# Patient Record
Sex: MALE | Race: WHITE | NOT HISPANIC OR LATINO | Employment: FULL TIME | URBAN - METROPOLITAN AREA
[De-identification: names, ages, dates, MRNs, and addresses within clinical notes are randomized per-mention and may not be internally consistent; named-entity substitution may affect disease eponyms.]

---

## 2022-09-12 ENCOUNTER — HOSPITAL ENCOUNTER (EMERGENCY)
Facility: CLINIC | Age: 27
Discharge: HOME | End: 2022-09-12
Attending: PHYSICIAN ASSISTANT | Admitting: PHYSICIAN ASSISTANT
Payer: COMMERCIAL

## 2022-09-12 VITALS
SYSTOLIC BLOOD PRESSURE: 112 MMHG | OXYGEN SATURATION: 99 % | BODY MASS INDEX: 28.12 KG/M2 | TEMPERATURE: 99.1 F | RESPIRATION RATE: 19 BRPM | WEIGHT: 175 LBS | DIASTOLIC BLOOD PRESSURE: 76 MMHG | HEIGHT: 66 IN | HEART RATE: 78 BPM

## 2022-09-12 DIAGNOSIS — F42.9 OBSESSIVE-COMPULSIVE DISORDER, UNSPECIFIED TYPE: ICD-10-CM

## 2022-09-12 LAB — SARS-COV-2 RNA RESP QL NAA+PROBE: NEGATIVE

## 2022-09-12 PROCEDURE — 90791 PSYCH DIAGNOSTIC EVALUATION: CPT

## 2022-09-12 PROCEDURE — C9803 HOPD COVID-19 SPEC COLLECT: HCPCS

## 2022-09-12 PROCEDURE — U0003 INFECTIOUS AGENT DETECTION BY NUCLEIC ACID (DNA OR RNA); SEVERE ACUTE RESPIRATORY SYNDROME CORONAVIRUS 2 (SARS-COV-2) (CORONAVIRUS DISEASE [COVID-19]), AMPLIFIED PROBE TECHNIQUE, MAKING USE OF HIGH THROUGHPUT TECHNOLOGIES AS DESCRIBED BY CMS-2020-01-R: HCPCS | Performed by: EMERGENCY MEDICINE

## 2022-09-12 PROCEDURE — 99204 OFFICE O/P NEW MOD 45 MIN: CPT | Performed by: PSYCHIATRY & NEUROLOGY

## 2022-09-12 PROCEDURE — U0005 INFEC AGEN DETEC AMPLI PROBE: HCPCS | Performed by: PHYSICIAN ASSISTANT

## 2022-09-12 PROCEDURE — 99285 EMERGENCY DEPT VISIT HI MDM: CPT | Mod: 25

## 2022-09-12 RX ORDER — HYDROXYZINE PAMOATE 25 MG/1
25 CAPSULE ORAL 3 TIMES DAILY PRN
Qty: 30 CAPSULE | Refills: 0 | Status: SHIPPED | OUTPATIENT
Start: 2022-09-12

## 2022-09-12 RX ORDER — FLUVOXAMINE MALEATE 50 MG
50 TABLET ORAL AT BEDTIME
Qty: 30 TABLET | Refills: 0 | Status: SHIPPED | OUTPATIENT
Start: 2022-09-12

## 2022-09-12 ASSESSMENT — ACTIVITIES OF DAILY LIVING (ADL)
ADLS_ACUITY_SCORE: 35
ADLS_ACUITY_SCORE: 35

## 2022-09-12 ASSESSMENT — ENCOUNTER SYMPTOMS
FEVER: 0
NERVOUS/ANXIOUS: 1
HALLUCINATIONS: 0

## 2022-09-12 NOTE — ED NOTES
Pt is discharge home to self in stable conditions, via private vehicle. Pt denies any S/H ideations, denies any A/V hallucinations. Med education and discharge instruction explained, pt verbalized understanding. All belongings returned to pt. Pt given supply of take home meds. Pt escorted out of unit in stable conditions.

## 2022-09-12 NOTE — ED NOTES
26 y/o white male came vol to ED for mental health stabilization. Pt is AAOx4, gait steady, skin intact. Pt verbalized feeling anxious and depress with worsening symptoms since 09/10/22 due to overwhelming stress about work and family. Pt stated he has a HX of anxiety and depression but is not currently taking any medications, nore seen a mental health expert. Pt denies any S/I and H/I, denies A/V hallucinations but stated within his life time he has had vivid vision of suicide but not currently and has never attempted suicide. Pt denies any medical conditions, denies any drug abuse but does use ETOH with last use being last Thursday, drinks on average 10 drinks per week. Pt denies any pain. Pt observed sad and depress, anxious, wringing his hands at times, seems short of breath when speaking but denies any respiratory distress. Assessment completed, pt oriented to unit, continue to monitor.

## 2022-09-12 NOTE — CONSULTS
Diagnostic Evaluation Consultation  Crisis Assessment    Patient was assessed: In Person  Patient location: [facility]  Was a release of information signed: Yes. Providers included on the release: all      Referral Data and Chief Complaint  Reji is a 27 year old, who uses he/him pronouns, and presents to the ED alone. Patient is referred to the ED by family/friends. Patient is presenting to the ED for the following concerns: worsening anxiety & OCD sx.      Informed Consent and Assessment Methods     Patient is his own guardian. Writer met with patient and explained the crisis assessment process, including applicable information disclosures and limits to confidentiality, assessed understanding of the process, and obtained consent to proceed with the assessment. Patient was observed to be able to participate in the assessment as evidenced by verbal understanding. Assessment methods included conducting a formal interview with patient, review of medical records, collaboration with medical staff, and obtaining relevant collateral information from family and community providers when available..     Over the course of this crisis assessment provided reassurance, offered validation, engaged patient in problem solving and disposition planning, worked with patient on safety and aftercare planning, worked with patient on brief, therapeutic activity: mindfulness, assisted in processing patient's thoughts and feeling relating to guilt & shame around intrusive thoughts and provided psychoeducation. Patient's response to interventions was receptive & engaged.     Summary of Patient Situation  Reji is a 28yo who presented to the ED due to worsening anxiety & intrusive thinking over the past few days. Pt reports he has been dealing with anxiety & OCD sx on & off since middle school but that he has been able to manage it on his own without medications or therapy. He reports that he has had a significant increase over the past few days.  "He reports he called into work yesterday due to the thoughts being too exhausting. He reports that he has mostly used distraction to cope as well as alcohol. He reports drinking about 2-4 drinks at a time & about 10-12 total a week.      Brief Psychosocial History  Reji currently lives with his father. He identifies his father, mother & a couple of friends as supports. He works full-time for [email or web address] as a [employer]. He identifies stressors of being short-staffed at work, at times living with his father, and his aunt  a couple of weeks ago.     Significant Clinical History  Reji denies any past treatment for his mental health despite dealing with anxiety since middle school. He reports he feels he's managed it pretty well independently up until recently.     Pt reports sx of racing thoughts, distressing intrusive thoughts, questioning if he is \"hypersexual\" due to his thoughts, disrupted appetite, shame & guilt around his intrusive thoughts. He reports he watches porn & wonders if he is a sexual deviant. Pt denies any desire/intent to act on his thoughts but is fearful of them. He did not disclose nature of thoughts but alluded to them being sexual in nature. He denies ever acting on them & expresses a desire to feel better.      Collateral Information    Wtr attempted to call pt's emergency contact, Venu Dixon [phone], no answer.        Risk Assessment  ESS-6  1.a. Over the past 2 weeks, have you had thoughts of killing yourself? No  1.b. Have you ever attempted to kill yourself and, if yes, when did this last happen? No   2. Recent or current suicide plan? No   3. Recent or current intent to act on ideation? No  4. Lifetime psychiatric hospitalization? No  5. Pattern of excessive substance use? No  6. Current irritability, agitation, or aggression? No  Scoring note: BOTH 1a and 1b must be yes for it to score 1 point, if both are not yes it is zero. All others are 1 point per number. If all questions " 1a/1b - 6 are no, risk is negligible. If one of 1a/1b is yes, then risk is mild. If either question 2 or 3, but not both, is yes, then risk is automatically moderate regardless of total score. If both 2 and 3 are yes, risk is automatically high regardless of total score.      Score: 0, mild risk      Does the patient have access to lethal means? No     Does the patient engage in non-suicidal self-injurious behavior (NSSI/SIB)? no     Does the patient have thoughts of harming others? No     Is the patient engaging in sexually inappropriate behavior?  no        Current Substance Abuse     Is there recent substance abuse? Substance type(s): alcohol Frequency: 3-4 times a week Quantity: 2-4 drinks Method: ingests Duration: a few months Last use: wknd     Was a urine drug screen or blood alcohol level obtained: No       Mental Status Exam     Affect: Constricted   Appearance: Appropriate    Attention Span/Concentration: Attentive  Eye Contact: Avoidant   Fund of Knowledge: Appropriate    Language /Speech Content: Fluent   Language /Speech Volume: Soft    Language /Speech Rate/Productions: Normal    Recent Memory: Intact   Remote Memory: Intact   Mood: Anxious, Depressed and Sad    Orientation to Person: Yes    Orientation to Place: Yes   Orientation to Time of Day: Yes    Orientation to Date: Yes    Situation (Do they understand why they are here?): Yes    Psychomotor Behavior: Normal    Thought Content: Clear   Thought Form: Obsessive/Perseverative      History of commitment: No       Medication    Psychotropic medications: No  Medication changes made in the last two weeks: No       Current Care Team    Primary Care Provider: No  Psychiatrist: No  Therapist: No  : No     CTSS or ARMHS: No  ACT Team: No  Other: No      Diagnosis    300.02 (F41.1) Generalized Anxiety Disorder  300.3 (F42) Unspecified Obsessive Compulsive and Related Disorder   311 (F32.9) Unspecified Depressive Disorder      Clinical Summary  and Substantiation of Recommendations    Writer at the time of assessment recommends discharge.  PT currently denies any suicidal ideation, intent, or planning.  PT denies any self-harm or homicidal ideation.  Pt presents as future and goal oriented.  PT was engaged in creating a safety plan and discharge plan.  PT has the following discharge providers medication management and psychotherapy.  Additionally, PT does not present as acutely psychotic, manic, delusional, or paranoid and need of acute stabilization.  Writer consulted with the attending provider Dr. Cuenca whom agreed with the recommendation.      Disposition    Recommended disposition: Individual Therapy and Medication Management       Reviewed case and recommendations with attending provider. Attending Name: Dr. Cuenca       Attending concurs with disposition: Yes       Patient concurs with disposition: Yes       Guardian concurs with disposition: NA      Final disposition: Individual therapy  and Medication management.     Outpatient Details (if applicable):   Aftercare plan and appointments placed in the AVS and provided to patient: Yes. Given to patient by RN    Was lethal means counseling provided as a part of aftercare planning? No; no safey concerns noted      Assessment Details    Patient interview started at: 2:45pm and completed at: 3:30pm.     Total duration spent on the patient case in minutes: .75 hrs      CPT code(s) utilized: 67882 - Psychotherapy for Crisis - 60 (30-74*) min       Tanesha Ribeiro-VIVI Durand, LMFT, [facility]  DEC - Triage & Transition Services  Callback: [phone]    Aftercare Plan  If I am feeling unsafe or I am in a crisis, I will:   Contact my established care providers   Call the National Suicide Prevention Lifeline: 988  Go to the nearest emergency room   Call 911     Warning signs that I or other people might notice when a crisis is developing for me: shaking, disrupted appetite, worsening thoughts.    Things I  am able to do on my own to cope or help me feel better: Use distraction skills, mindfulness     Things that I am able to do with others to cope or help me better: Talk     Things I can use or do for distraction: Work, spend time with others     Changes I can make to support my mental health and wellness: See a therapist,take medications     People in my life that I can ask for help: My family & friends     Your [facility] has a mental health crisis team you can call 24/7: [facility] Mobile Crisis  [phone]     Other things that are important when I'm in crisis: Reach out for help     Additional resources and information:   Medication Management & Therapy appointments    TIPP?stands for?Temperature,?Intense exercise,?Paced breathing, and?Paired muscle relaxation.     TEMPERATURE     When we re upset, our bodies often feel hot. To counter this, splash your face with cold water, hold an ice cube, or let the car s AC blow on your face. Changing your body temperature will help you cool down--both physically and emotionally.     INTENSE EXERCISE     Do intense exercise to match your intense emotion. You re not a marathon runner? That s okay, you don t need to be. Sprint down to the end of the street, jump in the pool for a few laps, or do jumping jacks until you ve tired yourself out. Increasing oxygen flow helps decrease stress levels. Plus, it s hard to stay dangerously upset when you re exhausted.     PACED BREATHING     Even something as simple as controlling your breath can have a profound impact on reducing emotional pain. There are many different types of breathing exercises. If you have a favorite, breathe it out. If you don t, try a technique called  box breathing . Each breath interval will be four seconds long. Take in air four seconds, hold it in four seconds, breathe out four, and hold four. And then start again. Continue to focus on this breathing pattern until you feel more calm. Steady breathing  "reduces your body s fight or flight response.     PAIRED MUSCLE RELAXATION     The science of paired muscle relaxation is fascinating. When you tighten a voluntary muscle, relax it, and allow it to rest, the muscle will become more relaxed than it was before it was tightened. Relaxed muscles require less oxygen,?so your breathing and heart rate will slow down. Try this technique by focusing on a group of muscles, such as the muscles in your arms. Tighten the muscles as much as you can for five seconds. Then let go of the tension. Let the muscles relax, and you ll begin to relax, as well.         Crisis Lines  Crisis Text Line  Text 196632  You will be connected with a trained live crisis counselor to provide support.    Por natanael, texto  LIMA a 951485 o texto a 442-AYUDAME en WhatsApp    The Joni Project (LGBTQ Youth Crisis Line)  4.895.344.8320  text START to 515-792      Community Routeware  Fast Tracker  Linking people to mental health and substance use disorder resources  Smackages.MediConnect Global (MCG)     [place] Mental Health Warm Line  Peer to peer support  Monday thru Saturday, 12 pm to 10 pm  232.082.1217 or 9.481.437.6064  Text \"Support\" to 90100    National Noble on Mental Illness (SHERIF)  858.738.9280 or 1.888.SHERIF.HELPS      Mental Health Apps  My3  https://AllPlayers.compp.org/    VirtualHopeBox  https://Elcelyx Therapeutics.org/apps/virtual-hope-box/      Additional Information  Today you were seen by a licensed mental health professional through Triage and Transition services, Behavioral Healthcare Providers ([facility])  for a crisis assessment in the Emergency Department at [facility].  It is recommended that you follow up with your established providers (psychiatrist, mental health therapist, and/or primary care doctor - as relevant) as soon as possible. Coordinators from [facility] will be calling you in the next 24-48 hours to ensure that you have the resources you need.  You can also contact P coordinators " directly at [phone]. You may have been scheduled for or offered an appointment with a mental health provider. [facility] maintains an extensive network of licensed behavioral health providers to connect patients with the services they need.  We do not charge providers a fee to participate in our referral network.  We match patients with providers based on a patient's specific needs, insurance coverage, and location.  Our first effort will be to refer you to a provider within your care system, and will utilize providers outside your care system as needed.            Tanesha Bains

## 2022-09-12 NOTE — Clinical Note
Reji Dixon was seen and treated in our emergency department on 9/12/2022.  He may return to work on 09/13/2022.       If you have any questions or concerns, please don't hesitate to call.      Marco Cuenca MD

## 2022-09-12 NOTE — DISCHARGE INSTRUCTIONS
Aftercare Plan  If I am feeling unsafe or I am in a crisis, I will:   Contact my established care providers   Call the National Suicide Prevention Lifeline: 988  Go to the nearest emergency room   Call 911     Warning signs that I or other people might notice when a crisis is developing for me: shaking, disrupted appetite, worsening thoughts.    Things I am able to do on my own to cope or help me feel better: Use distraction skills, mindfulness     Things that I am able to do with others to cope or help me better: Talk     Things I can use or do for distraction: Work, spend time with others     Changes I can make to support my mental health and wellness: See a therapist,take medications     People in my life that I can ask for help: My family & friends     Your [facility] has a mental health crisis team you can call 24/7: [facility] Mobile Crisis  [phone]     Other things that are important when I'm in crisis: Reach out for help     Additional resources and information:     Future Appointments    Date: Wednesday, 9/14/2022  Time: 10:00 am - 11:00 am  Provider: Wilbert Mari  Supervised by: Tarah Diop MA  LM  Location: [facility], [address] [place] [address], [address], [place] [address]  Phone: [phone]    Date: Friday, 9/16/2022  Time: 10:00 am - 11:00 am  Provider: Yessi Eason  MSN  CNP,PMHNP,RN  Location: [facility], [address] [place] [address], [address], [place] [address]  Phone: [phone]  Type: Medication Mgmt - Initial (In-Person)  Patient Instructions  Please arrive 20-30 minutes early for paperwork and bring Insurance cards and ID. If an  is needed will need to bring one with you.    TIPP?stands for?Temperature,?Intense exercise,?Paced breathing, and?Paired muscle relaxation.     TEMPERATURE     When we re upset, our bodies often feel hot. To counter this, splash your face with cold water, hold an ice cube, or let the car s AC blow on your face. Changing  your body temperature will help you cool down--both physically and emotionally.     INTENSE EXERCISE     Do intense exercise to match your intense emotion. You re not a marathon runner? That s okay, you don t need to be. Sprint down to the end of the street, jump in the pool for a few laps, or do jumping jacks until you ve tired yourself out. Increasing oxygen flow helps decrease stress levels. Plus, it s hard to stay dangerously upset when you re exhausted.     PACED BREATHING     Even something as simple as controlling your breath can have a profound impact on reducing emotional pain. There are many different types of breathing exercises. If you have a favorite, breathe it out. If you don t, try a technique called  box breathing . Each breath interval will be four seconds long. Take in air four seconds, hold it in four seconds, breathe out four, and hold four. And then start again. Continue to focus on this breathing pattern until you feel more calm. Steady breathing reduces your body s fight or flight response.     PAIRED MUSCLE RELAXATION     The science of paired muscle relaxation is fascinating. When you tighten a voluntary muscle, relax it, and allow it to rest, the muscle will become more relaxed than it was before it was tightened. Relaxed muscles require less oxygen,?so your breathing and heart rate will slow down. Try this technique by focusing on a group of muscles, such as the muscles in your arms. Tighten the muscles as much as you can for five seconds. Then let go of the tension. Let the muscles relax, and you ll begin to relax, as well.         Crisis Lines  Crisis Text Line  Text 454757  You will be connected with a trained live crisis counselor to provide support.    Por espanol, texto  LIMA a 035753 o texto a 442-AYUDAME en WhatsApp    The Joni Project (LGBTQ Youth Crisis Line)  5.850.287.0507  text START to 035-874      Community Emgo  Fast Tracker  Linking people to mental health and  "substance use disorder resources  fastUrigen Pharmaceuticalscktravelfoxn.Linux Networx     [place] Mental Health Warm Line  Peer to peer support  Monday thru Saturday, 12 pm to 10 pm  432.318.0085 or 8.391.536.1946  Text \"Support\" to 02338    National [place] on Mental Illness (SHERIF)  692.586.5656 or 1.888.SHERIF.HELPS      Mental Health Apps  My3  https://Dynamics Research.org/    VirtualHopeBox  https://Paymo/apps/virtual-hope-box/      Additional Information  Today you were seen by a licensed mental health professional through Triage and Transition services, Behavioral Healthcare Providers ([facility])  for a crisis assessment in the Emergency Department at [facility].  It is recommended that you follow up with your established providers (psychiatrist, mental health therapist, and/or primary care doctor - as relevant) as soon as possible. Coordinators from [facility] will be calling you in the next 24-48 hours to ensure that you have the resources you need.  You can also contact [facility] coordinators directly at [phone]. You may have been scheduled for or offered an appointment with a mental health provider. [facility] maintains an extensive network of licensed behavioral health providers to connect patients with the services they need.  We do not charge providers a fee to participate in our referral network.  We match patients with providers based on a patient's specific needs, insurance coverage, and location.  Our first effort will be to refer you to a provider within your care system, and will utilize providers outside your care system as needed.      "

## 2022-09-12 NOTE — ED TRIAGE NOTES
Pt hoping to go to [facility].   Denies SI / HI. Stating anxiety / depression. Cooperative for triage.      Triage Assessment     Row Name 09/12/22 1142       Triage Assessment (Adult)    Airway WDL WDL       Respiratory WDL    Respiratory WDL WDL       Skin Circulation/Temperature WDL    Skin Circulation/Temperature WDL WDL       Cardiac WDL    Cardiac WDL WDL       Peripheral/Neurovascular WDL    Peripheral Neurovascular WDL WDL       Cognitive/Neuro/Behavioral WDL    Cognitive/Neuro/Behavioral WDL X    Mood/Behavior sad

## 2022-09-12 NOTE — ED PROVIDER NOTES
"  History   Chief Complaint:  Mental Health Problem       The history is provided by the patient.      Reji Dixon is a 27 year old male with history of depression and OCD otherwise healthy who presents with worsening anxiety and depression since 2 days ago and hops of going to [facility]. He denies any known trigger or cause of symptoms. He does not currently take medications or see a mental health specialist. He does drink alcohol occasionally with an estimate of 10-12 drinks per week. He denies history of alcohol withdrawal. He denies suicidal or homicidal ideations and hallucinations, as well as fever or pain to any areas.    Review of Systems   Constitutional: Negative for fever.   Psychiatric/Behavioral: Negative for hallucinations, self-injury and suicidal ideas. The patient is nervous/anxious.    All other systems reviewed and are negative.    Allergies:  No Known Allergies    Medications:  No current daily medications on file.    Past Medical History:     Depression  OCD    Family History:    Thyroid disorder    Social History:  The patient presents in a private vehicle.  The patient presents alone.    Physical Exam     Patient Vitals for the past 24 hrs:   BP Temp Temp src Pulse Resp SpO2 Height Weight   09/12/22 1145 128/79 99.2  F (37.3  C) Temporal 85 17 100 % 1.676 m (5' 6\") 79.4 kg (175 lb)       Physical Exam  General: Alert and cooperative with exam. Sitting in chair.  Appears tearful.  Head:  Scalp is NC/AT  Eyes:  No scleral icterus, PERRL with normal tracking.  Not dilated or pinpoint.  ENT:  The external nose and ears are normal.   Neck:  Normal range of motion without rigidity.  Cardio: RRR, No M/R/G  Pulmonary: Lungs CTAB  Abdominal: Soft, No ttp or distension  Skin:  Exposed skin is warm and dry, No rash or lesions noted.  Neuro: No gross motor deficits. Speech coherent and not slurred.    No facial asymmetry.  GCS: 15.   Psych:  Awake. Alert. Calm.  Speech not pressured.  Linear thoughts.  Does " not appear to be responding to internal stimuli.    Emergency Department Course     Laboratory:  Labs Ordered and Resulted from Time of ED Arrival to Time of ED Departure   COVID-19 VIRUS (CORONAVIRUS) BY PCR - Normal       Result Value    SARS CoV2 PCR Negative        Emergency Department Course:    Reviewed:  I reviewed nursing notes, vitals, past medical history and Care Everywhere    Assessments:  1350 I obtained history and examined the patient as noted above.     Disposition:  The patient was transferred to [facility].     Impression & Plan     Medical Decision Makin year old male who presents with worsening anxiety and depression.  They are well appearing with unremarkable vitals and no indication for further medical workup at this time.  The patient is medically cleared at this time with no evidence of cardiopulmonary, metabolic, neurologic, infectious, or toxicologic emergency. They are seeking help and are amenable to transfer over to the [facility] unit at this time.  The patient was transferred over to the [facility] unit without incident.    Critical Care Time: none    Diagnosis:    ICD-10-CM    1. Anxiety  F41.9    2. Depression  F32.A        Discharge Medications:  New Prescriptions    No medications on file       Scribe Disclosure:  I, Daisy Moser, am serving as a scribe at 1:43 PM on 2022 to document services personally performed by Matti Rodarte PA-C based on my observations and the provider's statements to me.          Matti Rodarte PA-C  [phone]

## 2022-09-12 NOTE — ED PROVIDER NOTES
EmPATH Unit - Psychiatric Consultation  [facility] Emergency Department    Reji Dixon MRN: [id number]   Age: 27 year old YOB: 1995     History     Chief Complaint   Patient presents with     Mental Health Problem     HPI  Reji Dixon is a 27 year old male with history notable for suspected OCD who presents to the emergency department reporting worsening anxiety and intrusive thoughts.  He was determined to be medically stable and transferred to the EmPATH unit for psychiatric assessment.  On examination, the patient reports suspicion of longstanding OCD, first notable as a teenager.  At that time, he recalled obsessive handwashing due to a fear of germs.  He found himself to be obsessional with a few other behaviors however mostly manageable without significant distress.  More recently, he has been experiencing increased psychosocial stressors involving some death in the family and other issues involving his cousin which have triggered a heightened state of anxiety.  He has noted emerging obsessional thoughts with an unspecified sexual theme that because much distress for the patient to experience.  As a result of these thoughts, he often thinks he is unworthy of love and may be shunned from society.  He does not reveal the specific content of these thoughts.  He does not engage in any associated behaviors or compulsions.  He is seeking treatment options to aid in these symptoms.  He denied suicidal and homicidal thoughts.  He denied psychotic symptoms.  He anticipates discharge home today after our meeting.    Past Medical History  No past medical history on file.  No past surgical history on file.  fluvoxaMINE (LUVOX) 50 MG tablet  hydrOXYzine (VISTARIL) 25 MG capsule      No Known Allergies  Family History  No family history on file.  Social History       Past medical history, past surgical history, medications, allergies, family history, and social history were reviewed with the patient. No  "additional pertinent items.       Review of Systems  A complete review of systems was performed with pertinent positives and negatives noted in the HPI, and all other systems negative.    Physical Examination   BP: 128/79  Pulse: 85  Temp: 99.2  F (37.3  C)  Resp: 17  Height: 167.6 cm (5' 6\")  Weight: 79.4 kg (175 lb)  SpO2: 100 %    Physical Exam  General: Appears stated age.   Neuro: Alert and fully oriented. Extremities appear to demonstrate normal strength on visual inspection.   Integumentary/Skin: no rash visualized, normal color    Psychiatric Examination   Appearance: awake, alert  Attitude:  cooperative  Eye Contact:  fair  Mood:  anxious and sad   Affect:  mood congruent  Speech:  clear, coherent  Psychomotor Behavior:  tremor observed   Thought Process:  logical and linear  Associations:  no loose associations  Thought Content:  no evidence of suicidal ideation or homicidal ideation and no evidence of psychotic thought  Insight:  fair  Judgement:  intact  Oriented to:  time, person, and place  Attention Span and Concentration:  fair  Recent and Remote Memory:  fair  Language: able to name/identify objects without impairment  Fund of Knowledge: intact with awareness of current and past events    ED Course        Labs Ordered and Resulted from Time of ED Arrival to Time of ED Departure   COVID-19 VIRUS (CORONAVIRUS) BY PCR - Normal       Result Value    SARS CoV2 PCR Negative         Assessments & Plan (with Medical Decision Making)   Patient presenting with what appears to be an exacerbation of OCD with prominent obsessional thoughts without reported compulsive behaviors. Nursing notes reviewed noting no acute issues.     I have reviewed the assessment completed by the [facility].     Preliminary diagnosis:    ICD-10-CM    1. Obsessive-compulsive disorder, unspecified type  F42.9         Treatment Plan:  -Begin Luvox 50 mg nightly targeting reduction of OCD symptoms.  Risks and benefits were reviewed including " the likelihood of needing to titrate to a higher dose to achieve a meaningful response.  -Hydroxyzine 25-50 mg 3 times a day as needed for reduction of anxiety  -Referral for individual psychotherapy  -Referral for psychiatric medication management  -Discharge home today    After a period of working with the treatment team on the EmPATH unit, the patient's mental state improved to allow a safe transition to outpatient care. After counseling on the diagnosis, work-up, and treatment plan, the patient was discharged. Close follow-up with a psychiatrist and/or therapist was recommended and community psychiatric resources were provided. Patient is to return to the ED if any urgent or potentially life-threatening concerns.     At the time of discharge, the patient's acute suicide risk was determined to be low due to the following factors: Reduction in the intensity of mood/anxiety symptoms that preceded the admission, denial of suicidal thoughts, denies feeling helpless or helpless, not currently under the influence of alcohol or illicit substances, denies experiencing command hallucinations, no immediate access to firearms. The patient's acute risk could be higher if noncompliant with their treatment plan, medications, follow-up appointments or using illicit substances or alcohol. Protective factors include: social supports, stable housing, employment      --  Marco Cuenca MD   [facility] EMERGENCY DEPT  EmPATH Unit  9/12/2022      Marco Cuenca MD  09/12/22 [id number]

## 2025-01-06 ENCOUNTER — TELEPHONE (OUTPATIENT)
Dept: PULMONOLOGY | Facility: CLINIC | Age: 30
End: 2025-01-06
Payer: COMMERCIAL

## 2025-01-06 NOTE — TELEPHONE ENCOUNTER
January 6, 2025    We received a referral to schedule Reji for genetic counseling based on his reported history of borderline sweat chloride tests. I left a voicemail asking for a return call.    Shanel Salinas MS, [facility]  Genetic Counselor  [facility]  [facility], [place]  Phone: [phone]

## 2025-01-09 NOTE — TELEPHONE ENCOUNTER
January 9, 2025    I attempted to reach Reji again to schedule genetic counseling. Left a VM with return call information.    Shanel Salinas MS, [facility]  Genetic Counselor  [facility]  [facility], [place]  Phone: [phone]

## 2025-01-09 NOTE — TELEPHONE ENCOUNTER
January 9, 2025    Reji returned my call and schedule a genetic counseling appointment for 01/25/2025.    Shanel Salinas MS, [facility]  Genetic Counselor  [facility]  [facility]  Phone: [phone]

## 2025-01-23 ENCOUNTER — TRANSFERRED RECORDS (OUTPATIENT)
Dept: HEALTH INFORMATION MANAGEMENT | Facility: CLINIC | Age: 30
End: 2025-01-23
Payer: COMMERCIAL

## 2025-03-10 ENCOUNTER — TELEPHONE (OUTPATIENT)
Dept: PULMONOLOGY | Facility: CLINIC | Age: 30
End: 2025-03-10
Payer: COMMERCIAL

## 2025-03-10 NOTE — TELEPHONE ENCOUNTER
March 10, 2025    I left Reji a voicemail regarding the prior authorization for his genetic testing. I directed him to the [employer] message, or to call me back to discuss.    Shanel aSlinas MS, [facility]  Genetic Counselor  [facility]  [facility], [place]  Direct phone: [phone]  CF Phone: [phone]

## 2025-03-29 SDOH — HEALTH STABILITY: PHYSICAL HEALTH: ON AVERAGE, HOW MANY MINUTES DO YOU ENGAGE IN EXERCISE AT THIS LEVEL?: PATIENT DECLINED

## 2025-03-29 SDOH — HEALTH STABILITY: PHYSICAL HEALTH: ON AVERAGE, HOW MANY DAYS PER WEEK DO YOU ENGAGE IN MODERATE TO STRENUOUS EXERCISE (LIKE A BRISK WALK)?: 4 DAYS

## 2025-03-29 ASSESSMENT — SOCIAL DETERMINANTS OF HEALTH (SDOH): HOW OFTEN DO YOU GET TOGETHER WITH FRIENDS OR RELATIVES?: TWICE A WEEK

## 2025-04-02 ENCOUNTER — MYC REFILL (OUTPATIENT)
Dept: FAMILY MEDICINE | Facility: CLINIC | Age: 30
End: 2025-04-02

## 2025-04-02 ENCOUNTER — OFFICE VISIT (OUTPATIENT)
Dept: FAMILY MEDICINE | Facility: CLINIC | Age: 30
End: 2025-04-02
Payer: COMMERCIAL

## 2025-04-02 VITALS
HEART RATE: 72 BPM | TEMPERATURE: 97.5 F | DIASTOLIC BLOOD PRESSURE: 83 MMHG | HEIGHT: 67 IN | BODY MASS INDEX: 29.79 KG/M2 | RESPIRATION RATE: 18 BRPM | OXYGEN SATURATION: 97 % | WEIGHT: 189.8 LBS | SYSTOLIC BLOOD PRESSURE: 125 MMHG

## 2025-04-02 DIAGNOSIS — G43.111 INTRACTABLE MIGRAINE WITH AURA WITH STATUS MIGRAINOSUS: ICD-10-CM

## 2025-04-02 DIAGNOSIS — R19.4 BOWEL HABIT CHANGES: ICD-10-CM

## 2025-04-02 DIAGNOSIS — Z00.00 ANNUAL PHYSICAL EXAM: Primary | ICD-10-CM

## 2025-04-02 DIAGNOSIS — Z11.59 NEED FOR HEPATITIS C SCREENING TEST: ICD-10-CM

## 2025-04-02 DIAGNOSIS — Z13.6 CARDIOVASCULAR SCREENING; LDL GOAL LESS THAN 130: ICD-10-CM

## 2025-04-02 DIAGNOSIS — H61.22 IMPACTED CERUMEN OF LEFT EAR: ICD-10-CM

## 2025-04-02 DIAGNOSIS — R53.83 OTHER FATIGUE: ICD-10-CM

## 2025-04-02 DIAGNOSIS — G47.09 OTHER INSOMNIA: ICD-10-CM

## 2025-04-02 DIAGNOSIS — K21.00 GASTROESOPHAGEAL REFLUX DISEASE WITH ESOPHAGITIS, UNSPECIFIED WHETHER HEMORRHAGE: ICD-10-CM

## 2025-04-02 DIAGNOSIS — F42.9 OBSESSIVE-COMPULSIVE DISORDER, UNSPECIFIED TYPE: Primary | ICD-10-CM

## 2025-04-02 LAB
ALBUMIN SERPL BCG-MCNC: 4.8 G/DL (ref 3.5–5.2)
ALP SERPL-CCNC: 95 U/L (ref 40–150)
ALT SERPL W P-5'-P-CCNC: 31 U/L (ref 0–70)
ANION GAP SERPL CALCULATED.3IONS-SCNC: 15 MMOL/L (ref 7–15)
AST SERPL W P-5'-P-CCNC: 29 U/L (ref 0–45)
BASOPHILS # BLD AUTO: 0 10E3/UL (ref 0–0.2)
BASOPHILS NFR BLD AUTO: 1 %
BILIRUB SERPL-MCNC: 0.9 MG/DL
BUN SERPL-MCNC: 12 MG/DL (ref 6–20)
CALCIUM SERPL-MCNC: 9.4 MG/DL (ref 8.8–10.4)
CHLORIDE SERPL-SCNC: 102 MMOL/L (ref 98–107)
CHOLEST SERPL-MCNC: 165 MG/DL
CREAT SERPL-MCNC: 1.03 MG/DL (ref 0.67–1.17)
EGFRCR SERPLBLD CKD-EPI 2021: >90 ML/MIN/1.73M2
EOSINOPHIL # BLD AUTO: 0.1 10E3/UL (ref 0–0.7)
EOSINOPHIL NFR BLD AUTO: 3 %
ERYTHROCYTE [DISTWIDTH] IN BLOOD BY AUTOMATED COUNT: 11.8 % (ref 10–15)
FASTING STATUS PATIENT QL REPORTED: YES
FASTING STATUS PATIENT QL REPORTED: YES
FERRITIN SERPL-MCNC: 348 NG/ML (ref 31–409)
GLUCOSE SERPL-MCNC: 91 MG/DL (ref 70–99)
HCO3 SERPL-SCNC: 23 MMOL/L (ref 22–29)
HCT VFR BLD AUTO: 47.8 % (ref 40–53)
HCV AB SERPL QL IA: NONREACTIVE
HDLC SERPL-MCNC: 41 MG/DL
HGB BLD-MCNC: 16.3 G/DL (ref 13.3–17.7)
IMM GRANULOCYTES # BLD: 0 10E3/UL
IMM GRANULOCYTES NFR BLD: 0 %
LDLC SERPL CALC-MCNC: 105 MG/DL
LYMPHOCYTES # BLD AUTO: 1 10E3/UL (ref 0.8–5.3)
LYMPHOCYTES NFR BLD AUTO: 18 %
MCH RBC QN AUTO: 28.9 PG (ref 26.5–33)
MCHC RBC AUTO-ENTMCNC: 34.1 G/DL (ref 31.5–36.5)
MCV RBC AUTO: 85 FL (ref 78–100)
MONOCYTES # BLD AUTO: 0.4 10E3/UL (ref 0–1.3)
MONOCYTES NFR BLD AUTO: 7 %
NEUTROPHILS # BLD AUTO: 4.1 10E3/UL (ref 1.6–8.3)
NEUTROPHILS NFR BLD AUTO: 72 %
NONHDLC SERPL-MCNC: 124 MG/DL
PLATELET # BLD AUTO: 195 10E3/UL (ref 150–450)
POTASSIUM SERPL-SCNC: 4.1 MMOL/L (ref 3.4–5.3)
PROT SERPL-MCNC: 7.6 G/DL (ref 6.4–8.3)
RBC # BLD AUTO: 5.64 10E6/UL (ref 4.4–5.9)
SODIUM SERPL-SCNC: 140 MMOL/L (ref 135–145)
TRIGL SERPL-MCNC: 97 MG/DL
TSH SERPL DL<=0.005 MIU/L-ACNC: 0.74 UIU/ML (ref 0.3–4.2)
VIT B12 SERPL-MCNC: 404 PG/ML (ref 232–1245)
VIT D+METAB SERPL-MCNC: 13 NG/ML (ref 20–50)
WBC # BLD AUTO: 5.7 10E3/UL (ref 4–11)

## 2025-04-02 PROCEDURE — 82728 ASSAY OF FERRITIN: CPT | Performed by: PHYSICIAN ASSISTANT

## 2025-04-02 PROCEDURE — 3079F DIAST BP 80-89 MM HG: CPT | Performed by: PHYSICIAN ASSISTANT

## 2025-04-02 PROCEDURE — 36415 COLL VENOUS BLD VENIPUNCTURE: CPT | Performed by: PHYSICIAN ASSISTANT

## 2025-04-02 PROCEDURE — 3074F SYST BP LT 130 MM HG: CPT | Performed by: PHYSICIAN ASSISTANT

## 2025-04-02 PROCEDURE — 80053 COMPREHEN METABOLIC PANEL: CPT | Performed by: PHYSICIAN ASSISTANT

## 2025-04-02 PROCEDURE — 86803 HEPATITIS C AB TEST: CPT | Performed by: PHYSICIAN ASSISTANT

## 2025-04-02 PROCEDURE — 82607 VITAMIN B-12: CPT | Performed by: PHYSICIAN ASSISTANT

## 2025-04-02 PROCEDURE — 80061 LIPID PANEL: CPT | Performed by: PHYSICIAN ASSISTANT

## 2025-04-02 PROCEDURE — 84443 ASSAY THYROID STIM HORMONE: CPT | Performed by: PHYSICIAN ASSISTANT

## 2025-04-02 PROCEDURE — 1126F AMNT PAIN NOTED NONE PRSNT: CPT | Performed by: PHYSICIAN ASSISTANT

## 2025-04-02 PROCEDURE — 82306 VITAMIN D 25 HYDROXY: CPT | Performed by: PHYSICIAN ASSISTANT

## 2025-04-02 PROCEDURE — 85025 COMPLETE CBC W/AUTO DIFF WBC: CPT | Performed by: PHYSICIAN ASSISTANT

## 2025-04-02 PROCEDURE — 99395 PREV VISIT EST AGE 18-39: CPT | Mod: 25 | Performed by: PHYSICIAN ASSISTANT

## 2025-04-02 PROCEDURE — 69209 REMOVE IMPACTED EAR WAX UNI: CPT | Performed by: PHYSICIAN ASSISTANT

## 2025-04-02 RX ORDER — FLUVOXAMINE MALEATE 100 MG
TABLET ORAL
Status: CANCELLED | OUTPATIENT
Start: 2025-04-02

## 2025-04-02 ASSESSMENT — PAIN SCALES - GENERAL: PAINLEVEL_OUTOF10: NO PAIN (0)

## 2025-04-02 NOTE — PROGRESS NOTES
"Preventive Care Visit  [facility] CHRYSTAL Ta PA-C, Physician Assistant - Medical  Apr 2, 2025      Assessment & Plan     Annual physical exam  Annual labs ordered. Healthy diet and exercise reviewed. Recommended routine dental, eye, and skin screenings.  Due for Tdap next year.  Declines COVID shot today    - CBC with platelets and differential  - Comprehensive metabolic panel (BMP + Alb, Alk Phos, ALT, AST, Total. Bili, TP)  - TSH with free T4 reflex  - Lipid panel reflex to direct LDL Fasting    Need for hepatitis C screening test  - Hepatitis C Screen Reflex to HCV RNA Quant and Genotype    CARDIOVASCULAR SCREENING; LDL GOAL LESS THAN 130  Lipids ordered    Gastroesophageal reflux disease with esophagitis, unspecified whether hemorrhage  Bowel habit changes  Continue Pepcid.  Seemingly this has been a great addition.  Option to pursue more of a IBD/IBS workup if needed.    Intractable migraine with aura with status migrainosus  Improvement with riboflavin/magnesium supplementation.  Continue.    Other fatigue  Insomnia  Check vitamin levels, iron, thyroid.  If all normal can see if things improve over the next few months otherwise consider sleep study, CBT-I, initiation of trazodone?  Would have him discuss with medical provider.  - Vitamin D Deficiency  - Vitamin B12  - Ferritin    Impacted cerumen of left ear  Cerumen impaction left ear.  Ear lavage performed.  Patient tolerated procedure well.  - REMOVE IMPACTED CERUMEN      Patient has been advised of split billing requirements and indicates understanding: Yes    BMI  Estimated body mass index is 29.73 kg/m  as calculated from the following:    Height as of this encounter: 1.702 m (5' 7\").    Weight as of this encounter: 86.1 kg (189 lb 12.8 oz).   Weight management plan: Discussed healthy diet and exercise guidelines    Counseling  Appropriate preventive services were addressed with this patient via screening, questionnaire, or " discussion as appropriate for fall prevention, nutrition, physical activity, Tobacco-use cessation, social engagement, weight loss and cognition.  Checklist reviewing preventive services available has been given to the patient.  Reviewed patient's diet, addressing concerns and/or questions.   He is at risk for psychosocial distress and has been provided with information to reduce risk.           Subjective   Reji is a 30 year old, presenting for the following:  Physical    Here today for annual physical as well as follow-up on a few different concerns were addressed at his establish care visit.    -Notes improvement in GERD symptoms from Pepcid. Lower GI symptoms have also seemingly improved.  More regularity to his bowel movements etc.     -Improvement in migraines from supplementing with magnesium/riboflavin previously daily migraines however now has been significantly less.    -Continues to follow-up with mental health provider.  Notes generalized fatigue which has been present for a number of years.  Notes longstanding sleep issues.  Does endorse that he does not have the greatest sleep hygiene.  On Luvox which he takes nightly -uses hydroxyzine on occasion which is helpful.  Family history significant for sleep apnea in his half-brother.          4/2/2025    10:16 AM   Additional Questions   Roomed by Diana BUTLER MA          Advance Care Planning  Patient does not have a Health Care Directive: Discussed advance care planning with patient; however, patient declined at this time.      3/29/2025   General Health   How would you rate your overall physical health? (!) FAIR   Feel stress (tense, anxious, or unable to sleep) To some extent   (!) STRESS CONCERN      3/29/2025   Nutrition   Three or more servings of calcium each day? (!) I DON'T KNOW   Diet: Regular (no restrictions)   How many servings of fruit and vegetables per day? (!) I DON'T KNOW   How many sweetened beverages each day? 0-1         3/29/2025    Exercise   Days per week of moderate/strenous exercise 4 days   Average minutes spent exercising at this level Patient declined         3/29/2025   Social Factors   Frequency of gathering with friends or relatives Twice a week   Worry food won't last until get money to buy more No   Food not last or not have enough money for food? No   Do you have housing? (Housing is defined as stable permanent housing and does not include staying ouside in a car, in a tent, in an abandoned building, in an overnight shelter, or couch-surfing.) Yes   Are you worried about losing your housing? No   Lack of transportation? No   Unable to get utilities (heat,electricity)? No         3/29/2025   Dental   Dentist two times every year? Yes             Today's PHQ-2 Score:       4/2/2025    10:17 AM   PHQ-2 ( 1999 [employer])   Q1: Little interest or pleasure in doing things 0   Q2: Feeling down, depressed or hopeless 0   PHQ-2 Score 0         3/29/2025   Substance Use   Alcohol more than 3/day or more than 7/wk No   Do you use any other substances recreationally? (!) CANNABIS PRODUCTS     Social History     Tobacco Use    Smoking status: Never     Passive exposure: Never    Smokeless tobacco: Never   Vaping Use    Vaping status: Never Used   Substance Use Topics    Alcohol use: Yes     Comment: 4-6 drinks socially    Drug use: Yes     Types: Marijuana     Comment: edibles           3/29/2025   STI Screening   New sexual partner(s) since last STI/HIV test? No         3/29/2025   Contraception/Family Planning   Questions about contraception or family planning No        Reviewed and updated as needed this visit by Provider   Tobacco     Med Hx  Surg Hx  Fam Hx            History reviewed. No pertinent past medical history.  History reviewed. No pertinent surgical history.  Lab work is in process  Labs reviewed in EPIC  BP Readings from Last 3 Encounters:   04/02/25 125/83   01/03/25 134/89   09/12/22 112/76    Wt Readings from Last 3  Encounters:   04/02/25 86.1 kg (189 lb 12.8 oz)   01/03/25 89.5 kg (197 lb 4.8 oz)   09/12/22 79.4 kg (175 lb)                  There is no problem list on file for this patient.    History reviewed. No pertinent surgical history.    Social History     Tobacco Use    Smoking status: Never     Passive exposure: Never    Smokeless tobacco: Never   Substance Use Topics    Alcohol use: Yes     Comment: 4-6 drinks socially     Family History   Problem Relation Age of Onset    Thyroid Disease Mother     Hyperlipidemia Father     Sleep Apnea Brother     Thyroid Disease Maternal Grandmother          Current Outpatient Medications   Medication Sig Dispense Refill    EPINEPHrine (ANY BX GENERIC EQUIV) 0.3 MG/0.3ML injection 2-pack Inject 0.3 mLs (0.3 mg) into the muscle as needed for anaphylaxis. May repeat one time in 5-15 minutes if response to initial dose is inadequate. 2 each 1    fluvoxaMINE (LUVOX) 100 MG tablet Take 1 & 1/2 tablets by mouth two times daily*      hydrOXYzine (VISTARIL) 25 MG capsule Take 1 capsule (25 mg) by mouth 3 times daily as needed for anxiety 30 capsule 0    hydrOXYzine HCl (ATARAX) 10 MG tablet Take 1 tablet (10 mg) by mouth 3 times daily as needed for anxiety. 90 tablet 1    Riboflavin (VITAMIN B2 PO) Take by mouth.      propranolol (INDERAL) 20 MG tablet take 1-2 tablets by mouth two times daily as needed for anxiety. monitor blood pressure periodically. hold dose if blood pressure less than 90/60* (Patient not taking: Reported on 4/2/2025)       Allergies   Allergen Reactions    Bees Hives, Swelling and Rash     Bees     No lab results found.        Review of Systems  Constitutional, neuro, ENT, endocrine, pulmonary, cardiac, gastrointestinal, genitourinary, musculoskeletal, integument and psychiatric systems are negative, except as otherwise noted.     Objective    Exam  /83 (BP Location: Left arm, Patient Position: Sitting, Cuff Size: Adult Large)   Pulse 72   Temp 97.5  F (36.4  " C) (Temporal)   Resp 18   Ht 1.702 m (5' 7\")   Wt 86.1 kg (189 lb 12.8 oz)   SpO2 97%   BMI 29.73 kg/m     Estimated body mass index is 29.73 kg/m  as calculated from the following:    Height as of this encounter: 1.702 m (5' 7\").    Weight as of this encounter: 86.1 kg (189 lb 12.8 oz).    Physical Exam  GENERAL: alert and no distress  EYES: Eyes grossly normal to inspection, PERRL and conjunctivae and sclerae normal  HENT: Cerumen impaction left ear otherwise ear canals and TM's normal, nose and mouth without ulcers or lesions  NECK: no adenopathy, no asymmetry, masses, or scars  RESP: lungs clear to auscultation - no rales, rhonchi or wheezes  CV: regular rate and rhythm, normal S1 S2, no S3 or S4, no murmur, click or rub, no peripheral edema  ABDOMEN: soft, nontender, no hepatosplenomegaly, no masses and bowel sounds normal  MS: no gross musculoskeletal defects noted, no edema  SKIN: no suspicious lesions or rashes  NEURO: Normal strength and tone, mentation intact and speech normal  PSYCH: mentation appears normal, affect normal/bright      Signed Electronically by: Vitaly Ta PA-C    "

## 2025-04-02 NOTE — NURSING NOTE
Patient identified using two patient identifiers.  Ear exam showing wax occlusion completed by provider.  Solution: warm water was placed in the left ear(s) via irrigation tool: elephant ana Milner CMA on 4/2/2025 at 2:57 PM  .

## 2025-04-03 NOTE — RESULT ENCOUNTER NOTE
Yonny Mendoza,    -Stable blood counts. No signs of anemia.     -Your comprehensive metabolic panel which includes tests of liver function (ALT, AST, total bilirubin, alkaline phosphatase), kidney function (creatinine, BUN), electrolytes (sodium, potassium, calcium), and blood sugar (glucose) returned stable for you.    -TSH (thyroid stimulating hormone) level is normal which indicates normal circulating thyroid hormone levels.     -Slight elevation in one of your cholesterol levels.  Continue to focus on healthy diet and exercise.    -Vitamin D is low.  I would recommend start 2,000 international unit(s) OTC daily to help this.  Additionally, B12 is on the low end of normal.  Ideally your > 500 .  Can consider a B complex vitamin to get this up as well as the riboflavin for migraine prevention.    Let me know if you have any questions or concerns,     Vitaly Ta PA-C  [facility]

## 2025-04-08 NOTE — TELEPHONE ENCOUNTER
Triage Patient Outreach    Attempt # 1    Was call answered?  No.  Left voicemail to return call to Triage at [facility]    Keysha Cabral RN

## 2025-04-09 NOTE — TELEPHONE ENCOUNTER
Per dispense report, patient receives 100 mg tablets and takes 1.5 tablets BID. This has been managed by [facility], chart notes state patient continues to follow-up with mental health provider.     - Please advise if you agree to fill this medication or if patient should contact [facility] for refills.

## 2025-04-10 RX ORDER — FLUVOXAMINE MALEATE 100 MG
TABLET ORAL
Qty: 270 TABLET | Refills: 1 | Status: SHIPPED | OUTPATIENT
Start: 2025-04-10

## 2025-05-29 ENCOUNTER — VIRTUAL VISIT (OUTPATIENT)
Dept: FAMILY MEDICINE | Facility: CLINIC | Age: 30
End: 2025-05-29
Payer: COMMERCIAL

## 2025-05-29 DIAGNOSIS — G47.09 OTHER INSOMNIA: Primary | ICD-10-CM

## 2025-05-29 DIAGNOSIS — R53.83 OTHER FATIGUE: ICD-10-CM

## 2025-05-29 DIAGNOSIS — G43.111 INTRACTABLE MIGRAINE WITH AURA WITH STATUS MIGRAINOSUS: ICD-10-CM

## 2025-05-29 DIAGNOSIS — F42.9 OBSESSIVE-COMPULSIVE DISORDER, UNSPECIFIED TYPE: ICD-10-CM

## 2025-05-29 DIAGNOSIS — Z86.59 HISTORY OF DEPRESSION: ICD-10-CM

## 2025-05-29 NOTE — PROGRESS NOTES
Reji is a 30 year old who is being evaluated via a billable video visit.    How would you like to obtain your AVS? [facility]  If the video visit is dropped, the invitation should be resent by: Text to cell phone: [phone]  Will anyone else be joining your video visit? No      Assessment & Plan     Other insomnia  Other fatigue  Suspect multifactorial - reviewed labs.  Reinforced need for sleep hygiene adjustments.  Sleep medicine referral placed as well as mental health referral placed.  - Adult Mental Health  Referral  - Adult Sleep Eval & Management  Referral    Obsessive-compulsive disorder, unspecified type  Happy to pursue FMLA for him given combination of OCD, history of depression, insomnia, migraines reasonable to have a framework where he is allowed to miss 1-2 times per month for exacerbations, appts etc. He will send over the paperwork via [employer] which I will complete for him.  I will give him a call with any questions that it might have.  - Adult Mental Health  Referral      30 minutes spent by me on the date of the encounter doing chart review, review of outside records, review of test results, interpretation of tests, patient visit, and documentation     The longitudinal plan of care for the diagnosis(es)/condition(s) as documented were addressed during this visit. Due to the added complexity in care, I will continue to support Reji in the subsequent management and with ongoing continuity of care.    Subjective   Reji is a 30 year old, presenting for the following health issues:  Fatigue    Here today for routine follow-up.    Continues to see therapist for history of OCD as well as history of depression.  On Luvox.  Insomnia addressed at previous visit.  On-going despite sleep hygiene adjustments.  Wishes to see sleep medicine provider and potentially pursue a sleep study.  Wishes to see a new medication management mental health provider through [facility].    Has talked  "with his therapist, mental health provider, and boss about pursuing FMLA over the past 2 years.  States that he had a \"mental breakdown\" 2 years ago and is still not felt to 100% mentally since then.  Will miss work due to a combination of mental health, low energy/fatigue, migraines.  Wonders if there is some shift-work component as well as anxiety component to his insomnia.    Reviewed potential parameters.  Typically misses or is late to work 1-2 times per month.  Has had 2 corrective actions with his job for his attendance.    Migraines have improved over the past few months with addition of magnesium/riboflavi      History of Present Illness       Reason for visit:  Fatigue, sleeplessness    He eats 2-3 servings of fruits and vegetables daily.He consumes 0 sweetened beverage(s) daily.He exercises with enough effort to increase his heart rate 30 to 60 minutes per day.  He exercises with enough effort to increase his heart rate 3 or less days per week.   He is taking medications regularly.          Review of Systems  Constitutional, neuro, ENT, endocrine, pulmonary, cardiac, gastrointestinal, genitourinary, musculoskeletal, integument and psychiatric systems are negative, except as otherwise noted.      Objective    Vitals - Patient Reported  Systolic (Patient Reported): 123  Diastolic (Patient Reported): 80    Vitals:  No vitals were obtained today due to virtual visit.    Physical Exam   GENERAL: alert and no distress  EYES: Eyes grossly normal to inspection.  No discharge or erythema, or obvious scleral/conjunctival abnormalities.  RESP: No audible wheeze, cough, or visible cyanosis.    SKIN: Visible skin clear. No significant rash, abnormal pigmentation or lesions.  NEURO: Cranial nerves grossly intact.  Mentation and speech appropriate for age.  PSYCH: Appropriate affect, tone, and pace of words          Video-Visit Details    Type of service:  Video Visit   Originating Location (pt. Location): " Home    Distant Location (provider location):  On-site  Platform used for Video Visit: Sahil    The likelihood of other entities in the differential is insufficient to justify any further testing for them at this time. This was explained to the patient. The patient was advised that persistent or worsening symptoms would require further evaluation. Patient advised to call the office and if unable to reach to go to the emergency room if they develop any new or worsening symptoms. Expressed understanding and agreement with above stated plan.     Signed Electronically by: Vitaly Ta PA-C

## 2025-06-02 ENCOUNTER — PATIENT OUTREACH (OUTPATIENT)
Dept: CARE COORDINATION | Facility: CLINIC | Age: 30
End: 2025-06-02
Payer: COMMERCIAL

## 2025-06-10 ENCOUNTER — MYC MEDICAL ADVICE (OUTPATIENT)
Dept: FAMILY MEDICINE | Facility: CLINIC | Age: 30
End: 2025-06-10
Payer: COMMERCIAL

## 2025-08-31 ASSESSMENT — SLEEP AND FATIGUE QUESTIONNAIRES
HOW LIKELY ARE YOU TO NOD OFF OR FALL ASLEEP WHILE LYING DOWN TO REST IN THE AFTERNOON WHEN CIRCUMSTANCES PERMIT: HIGH CHANCE OF DOZING
HOW LIKELY ARE YOU TO NOD OFF OR FALL ASLEEP WHILE SITTING AND READING: SLIGHT CHANCE OF DOZING
HOW LIKELY ARE YOU TO NOD OFF OR FALL ASLEEP WHILE SITTING QUIETLY AFTER LUNCH WITHOUT ALCOHOL: SLIGHT CHANCE OF DOZING
HOW LIKELY ARE YOU TO NOD OFF OR FALL ASLEEP WHILE WATCHING TV: SLIGHT CHANCE OF DOZING
HOW LIKELY ARE YOU TO NOD OFF OR FALL ASLEEP IN A CAR, WHILE STOPPED FOR A FEW MINUTES IN TRAFFIC: WOULD NEVER DOZE
HOW LIKELY ARE YOU TO NOD OFF OR FALL ASLEEP WHILE SITTING INACTIVE IN A PUBLIC PLACE: WOULD NEVER DOZE
HOW LIKELY ARE YOU TO NOD OFF OR FALL ASLEEP WHEN YOU ARE A PASSENGER IN A CAR FOR AN HOUR WITHOUT A BREAK: SLIGHT CHANCE OF DOZING
HOW LIKELY ARE YOU TO NOD OFF OR FALL ASLEEP WHILE SITTING AND TALKING TO SOMEONE: WOULD NEVER DOZE

## 2025-09-03 ENCOUNTER — OFFICE VISIT (OUTPATIENT)
Dept: SLEEP MEDICINE | Facility: CLINIC | Age: 30
End: 2025-09-03
Payer: COMMERCIAL

## 2025-09-03 VITALS
BODY MASS INDEX: 31.66 KG/M2 | DIASTOLIC BLOOD PRESSURE: 84 MMHG | OXYGEN SATURATION: 99 % | HEIGHT: 66 IN | SYSTOLIC BLOOD PRESSURE: 130 MMHG | HEART RATE: 82 BPM | WEIGHT: 197 LBS

## 2025-09-03 DIAGNOSIS — G47.33 OBSTRUCTIVE SLEEP APNEA: Primary | ICD-10-CM

## 2025-09-03 DIAGNOSIS — R53.83 OTHER FATIGUE: ICD-10-CM

## 2025-09-03 DIAGNOSIS — G47.09 OTHER INSOMNIA: ICD-10-CM
